# Patient Record
Sex: FEMALE | Race: WHITE | NOT HISPANIC OR LATINO | Employment: UNEMPLOYED
[De-identification: names, ages, dates, MRNs, and addresses within clinical notes are randomized per-mention and may not be internally consistent; named-entity substitution may affect disease eponyms.]

---
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
## 2025-05-30 NOTE — PATIENT INSTRUCTIONS
Nice to meet you and your mother today.    As we discussed, my suspicion that there is an underlying rheumatologic disease is low, but additional work up is needed.    We discussed that the knee pain is most consistent with mechanical pain like patellar femoral pain and/or due to hypermobility of the ankles and knees; it is not arthritis.    We discussed that the VILLA is a screening test for lupus and related conditions but can also be elevated in normal teens, in the setting of other autoimmunity (like thyroid disease or celiac disease), or with infections or cancers.  Given the duration of your symptoms and work up to date the last two are unlikely.      Thus we made the following plan:  1.  Labs to be faxed to local clinic.    Orders Placed This Encounter   Procedures     CBC with platelets differential     Comprehensive metabolic panel     Complement C4     Complement C3     Complement Activity Total (CH50)     CRP inflammation     DNA double stranded antibodies     PREMA antibody panel     Erythrocyte sedimentation rate auto     IgG     IgA     UA with Microscopic reflex to Culture     Cardiolipin Bre IgG and IgM     Beta 2 Glycoprotein Antibodies IGG IGM     Lupus Anticoagulant Panel - 2 tubes     Tissue transglutaminase antibody IgA     Once they are received by me, I will write a results letter and if there are abnormalities my RNs or I will call you.  2.  No imaging.  3.  May benefit from PT again for a home program for your knees.  4.  May consider GI referral depending on lab results.  5.  Follow up to be determined after labs back.    I will send you a note, and Dr. Morrell and your primary care provider will also receive it.    Tomasa Gandhi M.D.   of Pediatrics  Pediatric Rheumatology      For Patient Education Materials:  [email or web address]       [facility] Pediatric Rheumatology    For Help:  The Pediatric Call Center at [phone] can help with  This encounter was created for [facility] orders only .    scheduling of routine follow up visits.  Sabine Greenfield and Liberty Diana are the Nurse Coordinators for the Division of Pediatric Rheumatology and can be reached by phone at [phone] or through [employer] ([email or web address]). They can help with questions about your child s rheumatic condition, medications, and test results.  For emergencies after hours or on the weekends, please call the page  at [phone] and ask to speak to the physician on-call for Pediatric Rheumatology. Please do not use [employer] for urgent requests.  Main  Services:  [phone]  o Hmong/Dominican/Tanzanian: [phone]  o Equatorial Guinean: [phone]  o Persian: [phone]    Internal Referrals: If we refer your child to another physician/team within [facility], you should receive a call to set this up. If you do not hear anything within a week, please call the Call Center at [phone].    External Referrals: If we refer your child to a physician/team outside of [facility], our team will send the referral order and relevant records to them. We ask that you call the place where your child is being referred to ensure they received the needed information and notify our team coordinators if not.    Imaging: If your child needs an imaging study that is not being performed the day of your clinic appointment, please call to set this up. For xrays, ultrasounds, and echocardiogram call [phone]. For CT or MRI call [phone].     MyChart: We encourage you to sign up for [email or web address] at [email or web address]. For assistance or questions, call [phone]. If your child is 12 years or older, a consent for proxy/parent access needs to be signed so please discuss this with your physician at the next visit.

## (undated) DEVICE — ENDO BITE BLOCK PEDS BATRIK LATEX FREE B1

## (undated) DEVICE — SPECIMEN CONTAINER W/20ML 10% BUFF FORMALIN C4322-11

## (undated) DEVICE — TUBING SUCTION MEDI-VAC 1/4"X20' N620A

## (undated) DEVICE — KIT ENDO TURNOVER/PROCEDURE CARRY-ON 101822

## (undated) DEVICE — SUCTION MANIFOLD DORNOCH ULTRA CART UL-CL500

## (undated) DEVICE — TUBING ENDOGATOR HYBRID IRRIG 100610 EGP-100

## (undated) DEVICE — SOL WATER IRRIG 1000ML BOTTLE 2F7114

## (undated) DEVICE — ENDO FORCEP ENDOJAW BIOPSY 2.8MMX230CM FB-220U

## (undated) DEVICE — KIT CONNECTOR FOR OLYMPUS ENDOSCOPES DEFENDO 100310